# Patient Record
Sex: FEMALE | Race: WHITE | NOT HISPANIC OR LATINO | URBAN - METROPOLITAN AREA
[De-identification: names, ages, dates, MRNs, and addresses within clinical notes are randomized per-mention and may not be internally consistent; named-entity substitution may affect disease eponyms.]

---

## 2023-10-15 ENCOUNTER — EMERGENCY (EMERGENCY)
Facility: HOSPITAL | Age: 28
LOS: 1 days | Discharge: HOME | End: 2023-10-15
Admitting: EMERGENCY MEDICINE
Payer: COMMERCIAL

## 2023-10-15 VITALS
TEMPERATURE: 98 F | HEART RATE: 87 BPM | WEIGHT: 121.25 LBS | HEIGHT: 63 IN | OXYGEN SATURATION: 97 % | RESPIRATION RATE: 19 BRPM | SYSTOLIC BLOOD PRESSURE: 107 MMHG | DIASTOLIC BLOOD PRESSURE: 73 MMHG

## 2023-10-15 VITALS
RESPIRATION RATE: 16 BRPM | DIASTOLIC BLOOD PRESSURE: 61 MMHG | HEART RATE: 75 BPM | TEMPERATURE: 98 F | SYSTOLIC BLOOD PRESSURE: 100 MMHG | OXYGEN SATURATION: 100 %

## 2023-10-15 LAB
ALBUMIN SERPL ELPH-MCNC: 3.6 G/DL — SIGNIFICANT CHANGE UP (ref 3.4–5)
ALP SERPL-CCNC: 58 U/L — SIGNIFICANT CHANGE UP (ref 40–120)
ALT FLD-CCNC: 23 U/L — SIGNIFICANT CHANGE UP (ref 12–42)
ANION GAP SERPL CALC-SCNC: 7 MMOL/L — LOW (ref 9–16)
APPEARANCE UR: ABNORMAL
APTT BLD: 32.2 SEC — SIGNIFICANT CHANGE UP (ref 24.5–35.6)
AST SERPL-CCNC: 21 U/L — SIGNIFICANT CHANGE UP (ref 15–37)
BILIRUB SERPL-MCNC: 0.2 MG/DL — SIGNIFICANT CHANGE UP (ref 0.2–1.2)
BILIRUB UR-MCNC: NEGATIVE — SIGNIFICANT CHANGE UP
BLD GP AB SCN SERPL QL: NEGATIVE — SIGNIFICANT CHANGE UP
BUN SERPL-MCNC: 12 MG/DL — SIGNIFICANT CHANGE UP (ref 7–23)
CALCIUM SERPL-MCNC: 9.3 MG/DL — SIGNIFICANT CHANGE UP (ref 8.5–10.5)
CHLORIDE SERPL-SCNC: 103 MMOL/L — SIGNIFICANT CHANGE UP (ref 96–108)
CO2 SERPL-SCNC: 26 MMOL/L — SIGNIFICANT CHANGE UP (ref 22–31)
COLOR SPEC: YELLOW — SIGNIFICANT CHANGE UP
CREAT SERPL-MCNC: 0.62 MG/DL — SIGNIFICANT CHANGE UP (ref 0.5–1.3)
DIFF PNL FLD: NEGATIVE — SIGNIFICANT CHANGE UP
EGFR: 124 ML/MIN/1.73M2 — SIGNIFICANT CHANGE UP
GLUCOSE SERPL-MCNC: 95 MG/DL — SIGNIFICANT CHANGE UP (ref 70–99)
GLUCOSE UR QL: NEGATIVE MG/DL — SIGNIFICANT CHANGE UP
HCG SERPL-ACNC: HIGH MIU/ML
HCG UR QL: POSITIVE — SIGNIFICANT CHANGE UP
HCT VFR BLD CALC: 36.3 % — SIGNIFICANT CHANGE UP (ref 34.5–45)
HGB BLD-MCNC: 12.4 G/DL — SIGNIFICANT CHANGE UP (ref 11.5–15.5)
INR BLD: 0.85 — SIGNIFICANT CHANGE UP (ref 0.85–1.18)
KETONES UR-MCNC: NEGATIVE MG/DL — SIGNIFICANT CHANGE UP
LEUKOCYTE ESTERASE UR-ACNC: NEGATIVE — SIGNIFICANT CHANGE UP
MAGNESIUM SERPL-MCNC: 1.9 MG/DL — SIGNIFICANT CHANGE UP (ref 1.6–2.6)
MCHC RBC-ENTMCNC: 31.2 PG — SIGNIFICANT CHANGE UP (ref 27–34)
MCHC RBC-ENTMCNC: 34.2 GM/DL — SIGNIFICANT CHANGE UP (ref 32–36)
MCV RBC AUTO: 91.4 FL — SIGNIFICANT CHANGE UP (ref 80–100)
NITRITE UR-MCNC: NEGATIVE — SIGNIFICANT CHANGE UP
NRBC # BLD: 0 /100 WBCS — SIGNIFICANT CHANGE UP (ref 0–0)
PH UR: 8 — SIGNIFICANT CHANGE UP (ref 5–8)
PHOSPHATE SERPL-MCNC: 4.1 MG/DL — SIGNIFICANT CHANGE UP (ref 2.5–4.5)
PLATELET # BLD AUTO: 317 K/UL — SIGNIFICANT CHANGE UP (ref 150–400)
POTASSIUM SERPL-MCNC: 3.6 MMOL/L — SIGNIFICANT CHANGE UP (ref 3.5–5.3)
POTASSIUM SERPL-SCNC: 3.6 MMOL/L — SIGNIFICANT CHANGE UP (ref 3.5–5.3)
PROT SERPL-MCNC: 7.5 G/DL — SIGNIFICANT CHANGE UP (ref 6.4–8.2)
PROT UR-MCNC: NEGATIVE MG/DL — SIGNIFICANT CHANGE UP
PROTHROM AB SERPL-ACNC: 9.6 SEC — SIGNIFICANT CHANGE UP (ref 9.5–13)
RBC # BLD: 3.97 M/UL — SIGNIFICANT CHANGE UP (ref 3.8–5.2)
RBC # FLD: 11.8 % — SIGNIFICANT CHANGE UP (ref 10.3–14.5)
RBC CASTS # UR COMP ASSIST: 3 /HPF — SIGNIFICANT CHANGE UP (ref 0–4)
RH IG SCN BLD-IMP: POSITIVE — SIGNIFICANT CHANGE UP
SODIUM SERPL-SCNC: 136 MMOL/L — SIGNIFICANT CHANGE UP (ref 132–145)
SP GR SPEC: 1.01 — SIGNIFICANT CHANGE UP (ref 1–1.03)
UROBILINOGEN FLD QL: 0.2 MG/DL — SIGNIFICANT CHANGE UP (ref 0.2–1)
WBC # BLD: 6.29 K/UL — SIGNIFICANT CHANGE UP (ref 3.8–10.5)
WBC # FLD AUTO: 6.29 K/UL — SIGNIFICANT CHANGE UP (ref 3.8–10.5)
WBC UR QL: 1 /HPF — SIGNIFICANT CHANGE UP (ref 0–5)

## 2023-10-15 PROCEDURE — 76817 TRANSVAGINAL US OBSTETRIC: CPT | Mod: 26

## 2023-10-15 PROCEDURE — 76801 OB US < 14 WKS SINGLE FETUS: CPT | Mod: 26

## 2023-10-15 PROCEDURE — 99284 EMERGENCY DEPT VISIT MOD MDM: CPT

## 2023-10-15 NOTE — ED ADULT TRIAGE NOTE - CHIEF COMPLAINT QUOTE
patient walk in c/o minor abd cramping, nausea and vaginal discharge since last night; 7 weeks pregnant; noticed light bleeding last night and this morning; concerned for possibly miscarriage

## 2023-10-15 NOTE — ED PROVIDER NOTE - NSFOLLOWUPINSTRUCTIONS_ED_ALL_ED_FT
Your Care Instructions  There is no sure way to prevent labour before your due date ( labour) or prevent most other pregnancy problems. But there are things you can do to increase your chances of a healthy pregnancy. Go to your appointments, follow your healthcare provider's advice, and take good care of yourself. Listen to your body, rest and sleep as needed, eat well, and exercise (if your healthcare provider agrees). And make sure to drink plenty of water.    Follow-up care is a key part of your treatment and safety. Be sure to make and go to all appointments, and call your doctor or nurse advice line (134 in most provinces and [facility]) if you are having problems. It's also a good idea to know your test results and keep a list of the medicines you take.    How can you care for yourself at home?  Make sure you go to your prenatal appointments. At each visit, your doctor or midwife will check your blood pressure and weight. Healthy weight gain in pregnancy helps to prevent high blood pressure and diabetes in pregnancy. It also helps the normal growth of your baby. Your doctor or midwife will also check to see if you have protein in your urine. High blood pressure and protein in urine are signs of possible pre-eclampsia. This condition can be dangerous for you and your baby.  Drink plenty of fluids, mostly water. Dehydration can cause contractions. If you have kidney, heart, or liver disease and have to limit fluids, talk with your healthcare provider before you increase the amount of fluids you drink.  Tell your healthcare provider right away if you notice any symptoms of an infection, such as:  Burning when you urinate.  A foul-smelling discharge from your vagina.  Vaginal itching.  Unexplained fever.  Unusual pain or soreness in your uterus or lower belly.  Eat a balanced diet. Include plenty of foods that are high in calcium, folic acid, protein, and iron.  Foods high in calcium include milk, cheese, yogurt, almonds, and broccoli.  Foods high in iron include red meat, shellfish, poultry, eggs, beans, raisins, whole grain bread, and leafy green vegetables.  Foods high in protein, vitamin D, and omega-3 fatty acids include low-mercury fish. Choose this at least twice a week.  Do not smoke, use tobacco or tobacco-like substances such as cannabis or vaping. Using these products can harm you and your baby’s health. If you need help quitting, talk to your healthcare provider about stop-smoking programs and medicines, or go to the [place] [employer] website. These can increase your chances of quitting for good.  Do not drink alcohol or use illegal drugs. Alcohol affects everyone differently and may be a risk to your health. Alcohol passes through the placenta to your baby and may cause problems with your baby’s growth, health, and development.  Follow your healthcare provider's directions about activity. Your healthcare provider will let you know how much, if any, exercise you can do.  Ask your healthcare provider if you can have sex. If you are at risk for early labour, your healthcare provider may ask you to not have sex.  Take care to prevent falls. During pregnancy, your joints are loose, and your balance is off. Sports such as bicycling, skiing, or in-line skating can increase your risk of falling. For information about activities you can do while pregnant to to Physical activity during pregnancy.  Avoid things that can make your body too hot and may be harmful to your baby, such as a hot tub or sauna. Or talk with your healthcare provider before doing anything that raises your body temperature. They can tell you if it's safe.  Do not take any over-the-counter or medicines or natural health products without talking to your healthcare provider or pharmacist first.  When should you call for help?  	  Share this information with your partner or a friend. They can help you watch for warning signs.    Call 911 anytime you think you, your partner, or a friend may need emergency care. For example, call if:    You passed out (lost consciousness).  You have a seizure.  You have severe vaginal bleeding.  You have severe pain in your belly or pelvis.  You have had fluid gushing or leaking from your vagina and you know or think the umbilical cord is bulging into your vagina. If this happens, immediately get down on your knees so your rear end (buttocks) is higher than your head. This will decrease the pressure on the cord until help arrives.  Call your doctor, midwife, or nurse advice line [phone] now or seek immediate medical care if:    You have signs of pre-eclampsia, such as:  Sudden swelling of your face, hands, or feet.  New vision problems (such as light sensitivity, blurring, or seeing spots).  A severe headache.  New right upper belly pain.  New severe nausea and vomiting.  You have any vaginal bleeding.  You have belly pain or cramping.  You have a fever.  You have had regular contractions (with or without pain) for an hour. This means that you have 6 or more within 1 hour after you change your position and drink fluids. See  Labour: Care Instructions for how to check if you are having contractions.  You have a sudden release of fluid or leaking from your vagina.  You have low back pain or pelvic pressure that does not go away.  You notice that your baby has stopped moving or is moving less 6 times in 2 hours.  Follow your healthcare provider's instructions about how often to count your baby's movements. To keep track of your baby's movements and for more information on how to count them, go to: Fetal Movements Count Chart.  You have severe nausea or vomiting – vomiting more than 3 times a day or are too nauseated to eat or drink (especially if you also have fever or pain).  Watch closely for changes in your health, and be sure to contact your doctor, midwife, or nurse advice line if you have any problems.

## 2023-10-15 NOTE — ED PROVIDER NOTE - PATIENT PORTAL LINK FT
You can access the FollowMyHealth Patient Portal offered by [facility] by registering at the following website: [email or web address]. By joining [facility]’s FollowMyHealth portal, you will also be able to view your health information using other applications (apps) compatible with our system.

## 2023-10-15 NOTE — ED PROVIDER NOTE - OBJECTIVE STATEMENT
28-year-old female, currently 7 weeks gestation, G1, P0, LMP August 28, presents to the emergency department for pelvic cramping, spotting that has resolved, that started 2 days ago.  Patient states that she has not even gone through 1 tampon or pad.  Patient states that she has an OB, however has not had an ultrasound for blood work done.  Denies nausea, vomiting, diarrhea, constipation, fevers, chills.  Denies any urinary symptoms.

## 2023-10-15 NOTE — ED ADULT NURSE NOTE - NSFALLUNIVINTERV_ED_ALL_ED
Bed/Stretcher in lowest position, wheels locked, appropriate side rails in place/Call bell, personal items and telephone in reach/Instruct patient to call for assistance before getting out of bed/chair/stretcher/Non-slip footwear applied when patient is off stretcher/[place] to call system/Physically safe environment - no spills, clutter or unnecessary equipment/Purposeful proactive rounding/Room/bathroom lighting operational, light cord in reach Quinolones Counseling:  I discussed with the patient the risks of fluoroquinolones including but not limited to GI upset, allergic reaction, drug rash, diarrhea, dizziness, photosensitivity, yeast infections, liver function test abnormalities, tendonitis/tendon rupture.

## 2023-10-15 NOTE — ED ADULT NURSE NOTE - OBJECTIVE STATEMENT
Pt presented to ed co LRQ pain (cramping) 3/10 with slight vaginal bleed x last night. Pt denies Hx, reported allergy to sulfa drugs and no current medications. Negative trauma. Negative dysuria

## 2023-10-15 NOTE — ED PROVIDER NOTE - CLINICAL SUMMARY MEDICAL DECISION MAKING FREE TEXT BOX
28-year-old female presents this emergency department for cramping and spotting, currently 7 weeks gestation  Labs, coags, type and screen ordered  Ultrasound ordered  We will continue to monitor patient

## 2023-10-15 NOTE — ED ADULT NURSE NOTE - PAIN: PRESENCE, MLM
Message   Received: Yesterday   Message Contents   RUPA Rawls, Med Ass't   Caller: Unspecified (3 days ago,  4:29 PM)             This will need to be addressed further by a sleep doctor.     She should ask her PCP for a referral.      Pt was informed, she verbalized understanding.  
PA for silenor was denied. Letter is scanned into media.   
Pt calling states insurance co wants her to try and fail Eszopiclone before they will approve PA for the silenor. She states needs help sleeping all OTC meds are not helping.   
complains of pain/discomfort

## 2023-10-15 NOTE — ED PROVIDER NOTE - PROGRESS NOTE DETAILS
Pt is sitting comfortably in room, no acute distress  Labs reviewed, all unremarkable  U/S is pending. Patient sitting comfortably room, no acute distress  Labs and imaging reviewed  Ultrasound shows an IUP at 6 weeks and 5 days with a fetal heart rate of 170  Patient stable for discharge  Results to patient.  Patient understands and agrees with plan.  Which will be primary care doctor and OB/GYN in 2 to 3 days.

## 2023-10-17 DIAGNOSIS — R10.2 PELVIC AND PERINEAL PAIN: ICD-10-CM

## 2023-10-17 DIAGNOSIS — Z3A.01 LESS THAN 8 WEEKS GESTATION OF PREGNANCY: ICD-10-CM

## 2023-10-17 DIAGNOSIS — Z88.2 ALLERGY STATUS TO SULFONAMIDES: ICD-10-CM

## 2023-10-17 DIAGNOSIS — O26.891 OTHER SPECIFIED PREGNANCY RELATED CONDITIONS, FIRST TRIMESTER: ICD-10-CM
